# Patient Record
Sex: FEMALE | Race: WHITE | Employment: OTHER | URBAN - METROPOLITAN AREA
[De-identification: names, ages, dates, MRNs, and addresses within clinical notes are randomized per-mention and may not be internally consistent; named-entity substitution may affect disease eponyms.]

---

## 2017-01-16 NOTE — PATIENT DISCUSSION
PITUITARY TUMOR: STABLE. NO EVIDENCE OF OCULAR INVOLVEMENT. FOLLOW UP WITH DR Alfred Canavan AS SCHEDULED. LAST MRI 12/2016.  FOLLOW FOR CHANGES,OU

## 2017-01-16 NOTE — PATIENT DISCUSSION
BREANNA OU:  PRESCRIBED UV PROTECTION TO SLOW GROWTH. PRESCRIBE ARTIFICAL TEARS TO INCREASE COMFORT.

## 2017-01-16 NOTE — PATIENT DISCUSSION
AMD (DRY), OU:  PRESCRIBE AREDS 2 VITAMINS AND UV PROTECTION TO SLOW PROGRESSION. SMOKING CESSATION EMPHASIZED. PRESCRIBE REGULAR AMSLER GRID CHECKS TO SELF MONITOR. PATIENT TO CALL WITH CHANGES FOR RETINA CONSULT. RETURN FOR FOLLOW-UP AS SCHEDULED.

## 2017-01-16 NOTE — PATIENT DISCUSSION
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

## 2018-02-16 NOTE — PATIENT DISCUSSION
PITUITARY TUMOR: STABLE. NO EVIDENCE OF OCULAR INVOLVEMENT. FOLLOW UP WITH DR Janay Stokes AS SCHEDULED. LAST MRI 12/2017.  FOLLOW FOR CHANGES,OU

## 2018-02-16 NOTE — PATIENT DISCUSSION
GLAUCOMA SUSPECT, OU : INTRAOCULAR PRESSURE  IS WITHIN ACCEPTABLE LIMITS. RETURN 6 MONTHS FOR IOP CHECK WITH RNFL OCT WITH [facility]. NO DROP THERAPY NEEDED AT THIS TIME.

## 2018-08-31 NOTE — PATIENT DISCUSSION
GLAUCOMA SUSPECT, OU : INTRAOCULAR PRESSURE LOW AND OCT IS WITHIN ACCEPTABLE LIMITS. UNABLE TO GET PACHS TODAY (PACHYMETER NOT WORKING)- COMPLETE AT NEXT EXAM. NO DROP THERAPY NEEDED AT THIS TIME.

## 2019-12-18 NOTE — PATIENT DISCUSSION
H/O PITUITARY TUMOR: STABLE. NO EVIDENCE OF OCULAR INVOLVEMENT. FOLLOW UP WITH DR Janay Stokes AS SCHEDULED.

## 2019-12-18 NOTE — PATIENT DISCUSSION
POSTERIOR CAPSULAR FIBROSIS, OU:  VISUALLY SIGNIFICANT. OPTION OF YAG LASER VERSUS UPDATING GLASSES VERSUS FOLLOWING DISCUSSED. RBA'S DISCUSSED, PATIENT UNDERSTANDS AND DESIRES YAG LASER TO INCREASE VISION FOR WATCHING TV AND READING. SCHEDULE YAG LASER OD THEN OS.

## 2019-12-18 NOTE — PATIENT DISCUSSION
GLAUCOMA SUSPECT, OU : INTRAOCULAR PRESSURE IS WITHIN ACCEPTABLE LIMITS. GONIO DONE TODAY TO DOCUMENT ANGLES. NO DROP THERAPY NEEDED AT THIS TIME.

## 2021-06-03 NOTE — PATIENT DISCUSSION
H/O PITUITARY TUMOR: STABLE. NO EVIDENCE OF OCULAR INVOLVEMENT. FOLLOW UP WITH DR Richard Daniel AS SCHEDULED.

## 2022-04-08 ENCOUNTER — PREPPED CHART (OUTPATIENT)
Dept: URBAN - METROPOLITAN AREA CLINIC 15 | Facility: CLINIC | Age: 72
End: 2022-04-08

## 2022-04-08 ENCOUNTER — ESTABLISHED PATIENT (OUTPATIENT)
Dept: URBAN - METROPOLITAN AREA CLINIC 15 | Facility: CLINIC | Age: 72
End: 2022-04-08

## 2022-04-08 DIAGNOSIS — H04.123: ICD-10-CM

## 2022-04-08 DIAGNOSIS — H26.493: ICD-10-CM

## 2022-04-08 PROCEDURE — 99214 OFFICE O/P EST MOD 30 MIN: CPT

## 2022-04-08 PROCEDURE — 92015 DETERMINE REFRACTIVE STATE: CPT

## 2022-04-08 ASSESSMENT — VISUAL ACUITY
OS_SC: J1
OD_SC: J7
OS_PH: 20/25
OD_SC: 20/40
OS_SC: 20/30
OD_PH: 20/25

## 2022-04-08 ASSESSMENT — TONOMETRY
OD_IOP_MMHG: 10
OS_IOP_MMHG: 10

## 2022-04-18 PROBLEM — Z98.890: Noted: 2022-04-18

## 2022-05-20 ENCOUNTER — POST-OP (OUTPATIENT)
Dept: URBAN - METROPOLITAN AREA CLINIC 15 | Facility: CLINIC | Age: 72
End: 2022-05-20

## 2022-05-20 DIAGNOSIS — H04.123: ICD-10-CM

## 2022-05-20 DIAGNOSIS — Z96.1: ICD-10-CM

## 2022-05-20 DIAGNOSIS — H26.493: ICD-10-CM

## 2022-05-20 PROCEDURE — 99024 POSTOP FOLLOW-UP VISIT: CPT

## 2022-05-20 PROCEDURE — 92015 DETERMINE REFRACTIVE STATE: CPT

## 2022-05-20 ASSESSMENT — VISUAL ACUITY
OD_CC: J1+
OS_SC: 20/25
OD_SC: 20/25
OS_CC: J3

## 2022-05-20 ASSESSMENT — TONOMETRY
OD_IOP_MMHG: 10
OS_IOP_MMHG: 12

## 2022-07-02 RX ORDER — DENOSUMAB 60 MG/ML
INJECTION SUBCUTANEOUS
COMMUNITY

## 2022-07-02 RX ORDER — NAPROXEN SODIUM 220 MG
TABLET ORAL
COMMUNITY
End: 2022-10-27

## 2022-07-02 RX ORDER — AZITHROMYCIN 250 MG/1
TABLET, FILM COATED ORAL
COMMUNITY
End: 2022-07-26 | Stop reason: ALTCHOICE

## 2022-07-02 RX ORDER — B-COMPLEX WITH VITAMIN C
TABLET ORAL
COMMUNITY
End: 2022-07-26 | Stop reason: CLARIF

## 2022-07-06 NOTE — PATIENT DISCUSSION
Discussed findings with patient. Rx AREDS 2 and monitor at home with [id number] [facility]. Patient educated on importance of UV protection, healthy diet and no smoking. Advised patient to RTC immediately if vision worsen and grid appears darker, wavy or blank. Continue follow up with Dr Spenser Adame.

## 2022-07-26 PROBLEM — F17.200 TOBACCO USE DISORDER: Status: ACTIVE | Noted: 2022-07-26

## 2022-07-26 PROBLEM — J02.9 SORE THROAT: Status: ACTIVE | Noted: 2022-07-26

## 2022-07-26 PROBLEM — Z11.52 ENCOUNTER FOR SCREENING FOR COVID-19: Status: ACTIVE | Noted: 2022-07-26

## 2022-07-26 PROBLEM — L65.9 HAIR LOSS: Status: ACTIVE | Noted: 2022-07-26

## 2022-07-26 PROBLEM — M81.0 OSTEOPOROSIS: Status: ACTIVE | Noted: 2022-07-26

## 2022-07-26 PROBLEM — D75.839 THROMBOCYTOSIS: Status: ACTIVE | Noted: 2022-07-26

## 2022-07-26 PROBLEM — R51.9 FREQUENT HEADACHES: Status: ACTIVE | Noted: 2022-07-26

## 2022-07-26 PROBLEM — R92.8 ABNORMAL ULTRASOUND OF BREAST: Status: ACTIVE | Noted: 2022-07-26

## 2022-07-26 PROBLEM — F98.8 ADD (ATTENTION DEFICIT DISORDER) WITHOUT HYPERACTIVITY: Status: ACTIVE | Noted: 2022-07-26

## 2022-07-26 PROBLEM — E78.5 HYPERLIPIDEMIA: Status: ACTIVE | Noted: 2022-07-26

## 2022-07-26 PROBLEM — E03.9 HYPOTHYROIDISM (ACQUIRED): Status: ACTIVE | Noted: 2022-07-26

## 2022-07-26 PROBLEM — L57.0 ACTINIC KERATOSIS: Status: ACTIVE | Noted: 2022-07-26

## 2022-07-26 PROBLEM — E04.1 THYROID NODULE: Status: ACTIVE | Noted: 2022-07-26

## 2022-07-26 PROBLEM — R53.82 CHRONIC FATIGUE: Status: ACTIVE | Noted: 2022-07-26

## 2022-07-26 PROBLEM — N95.8 OTHER SPECIFIED MENOPAUSAL AND PERIMENOPAUSAL DISORDERS: Status: ACTIVE | Noted: 2022-07-26

## 2022-07-26 PROBLEM — I77.9 BILATERAL CAROTID ARTERY DISEASE (HCC): Status: ACTIVE | Noted: 2022-07-26

## 2022-07-26 PROBLEM — F33.0 MILD EPISODE OF RECURRENT MAJOR DEPRESSIVE DISORDER (HCC): Status: ACTIVE | Noted: 2022-07-26

## 2022-07-26 PROBLEM — E04.2 MULTINODULAR THYROID: Status: ACTIVE | Noted: 2022-07-26

## 2022-07-26 PROBLEM — F32.9 MAJOR DEPRESSION, SINGLE EPISODE: Status: ACTIVE | Noted: 2022-07-26

## 2022-08-04 PROBLEM — M48.062 LUMBAR STENOSIS WITH NEUROGENIC CLAUDICATION: Status: ACTIVE | Noted: 2022-08-04

## 2022-08-04 PROBLEM — M48.061 LUMBAR STENOSIS WITHOUT NEUROGENIC CLAUDICATION: Status: ACTIVE | Noted: 2022-08-04

## 2022-08-25 PROBLEM — J02.9 SORE THROAT: Status: RESOLVED | Noted: 2022-07-26 | Resolved: 2022-08-25

## 2022-10-27 PROBLEM — M79.601 ARM PAIN, ANTERIOR, RIGHT: Status: ACTIVE | Noted: 2022-10-27

## 2022-11-16 ENCOUNTER — ESTABLISHED PATIENT (OUTPATIENT)
Dept: URBAN - METROPOLITAN AREA CLINIC 15 | Facility: CLINIC | Age: 72
End: 2022-11-16

## 2022-11-16 DIAGNOSIS — H26.492: ICD-10-CM

## 2022-11-16 DIAGNOSIS — H04.123: ICD-10-CM

## 2022-11-16 DIAGNOSIS — Z96.1: ICD-10-CM

## 2022-11-16 PROCEDURE — 99213 OFFICE O/P EST LOW 20 MIN: CPT

## 2022-11-16 ASSESSMENT — VISUAL ACUITY
OD_SC: 20/30+2
OU_SC: 20/25+2
OS_SC: 20/30+2
OD_SC: J5
OU_SC: J5
OS_SC: J5

## 2022-11-16 ASSESSMENT — TONOMETRY
OS_IOP_MMHG: 12
OD_IOP_MMHG: 10

## 2023-04-14 ENCOUNTER — EMERGENCY VISIT (OUTPATIENT)
Dept: URBAN - METROPOLITAN AREA CLINIC 16 | Facility: CLINIC | Age: 73
End: 2023-04-14

## 2023-04-14 DIAGNOSIS — B02.39: ICD-10-CM

## 2023-04-14 PROCEDURE — 99213 OFFICE O/P EST LOW 20 MIN: CPT

## 2023-04-14 ASSESSMENT — VISUAL ACUITY
OD_SC: 20/25
OS_SC: 20/25

## 2023-04-14 ASSESSMENT — TONOMETRY
OS_IOP_MMHG: 11
OD_IOP_MMHG: 10

## 2023-04-18 ENCOUNTER — FOLLOW UP (OUTPATIENT)
Dept: URBAN - METROPOLITAN AREA CLINIC 16 | Facility: CLINIC | Age: 73
End: 2023-04-18

## 2023-04-18 DIAGNOSIS — B02.39: ICD-10-CM

## 2023-04-18 DIAGNOSIS — H04.123: ICD-10-CM

## 2023-04-18 DIAGNOSIS — Z96.1: ICD-10-CM

## 2023-04-18 PROCEDURE — 99213 OFFICE O/P EST LOW 20 MIN: CPT

## 2023-04-18 ASSESSMENT — VISUAL ACUITY
OS_SC: 20/20
OD_SC: 20/20-1

## 2023-08-31 NOTE — PATIENT DISCUSSION
Assessment/Plan:   Diagnoses and all orders for this visit:    Encounter to establish care  Palpitations  - established with Cardio and had a c/s on 8/21/2023  - s/p nml ECHO, has a Holter Monitor in place  - likely her s/s are 2/2 low Hb - see below     Low hemoglobin  - noted to have Hb 10.1   - has been taking Fe QD since 8/21/2023   - last ferritin was low (22 ng/mL) in 2021 and she has had chronic bleeding from uterine polyps without iron supplementation   - per pt scheduled for [facility] in 9/[facility]   - Fe panel pending (ordered by Cardio) - based on results may have to increase PO Fe to BID or will need referral to Heme/Onc for IV Fe supplementation   - educational handout given to pt re: Anemia   Iron deficiency anemia due to chronic blood loss  Uterine polyp    Other orders  -     Discontinue: phytonadione (MEPHYTON) 5 mg tablet; Take 10 mg by mouth daily (Patient not taking: Reported on 8/31/2023)  -     valACYclovir (VALTREX) 1,000 mg tablet  -     Docusate Sodium 100 MG capsule; TAKE ONE CAPSULE BY MOUTH TWICE A DAY FOR CONSTIPATION  -     sulfaSALAzine (AZULFIDINE) 500 mg tablet; Take 500 mg by mouth 4 (four) times a day          Subjective:    Patient ID: Nayan Brasher is a 32 y.o. female.   Nayan Brasher is a 32 y.o. female who presents to the office to establish care, an annual exam and ER f/u   - prior PCP: was going to [facility] (served in the [employer] for [employer])   - Specialists: Cardio   - PMHx: Allergies, Anemia, GERD, Depression/Anxiety, Migraines, Inflammation, HSV  - allergies: shellfish - angioedema   - Meds: see med rec   - PSHx: wisdom teeth extraction   - FHx: M (a/w), F (a/w), MA (cancer)  - Immunizations: UTD with Tdap, UTD with COVID IMMs but no Booster, unsure of HPV   - GYN Hx: [facility] - [facility] - UTD with annual and Cervical Ca screening   - Hm: due for Breast Ca screening at 41yo, due for Colon Ca screening at 44yo   - diet/exercise: active, balanced diet - Comments:Any Combo drinks on ave 16oz/coffee/day, herbal tea sometimes but no soda   - social: denies tob/illicts, social EtOH (wine)   - sexual Hx: active with spouse, denies STD screening   - last vision: no glasses or contacts, denies change in vision   - last dental: last OV was ~1yr ago   - was seen in the ER on 8/20/2023 - noted to have low hemoglobin (Fe panel pending)  - was see by Cardio on 8/21/2023 - s/p nml ECHO, has a Holter Monitor in place  - no records from the [place] - "uterine polyps" and irregular uterine bleeding - has surgery (polypectomy) scheduled on 9/15/2023   - (+) palpitations, fatigue, SOB  - ROS: today in the office pt denies F/C/N/V/HA/visual changes/wheezing/abd pain/D/LE edema      The following portions of the patient's history were reviewed and updated as appropriate: allergies, current medications, past family history, past medical history, past social history, past surgical history and problem list.    Review of Systems  as per HPI    Objective:  /78 (BP Location: Left arm, Patient Position: Sitting, Cuff Size: Standard)   Resp 16   Ht 5' 6" (1.676 m)   Wt 86.2 kg (190 lb)   BMI 30.67 kg/m²    Physical Exam  Vitals reviewed. Constitutional:       General: She is not in acute distress. Appearance: Normal appearance. She is not ill-appearing, toxic-appearing or diaphoretic. HENT:      Head: Normocephalic and atraumatic. Right Ear: External ear normal.      Left Ear: External ear normal.      Nose: Nose normal.   Eyes:      General: No scleral icterus. Right eye: No discharge. Left eye: No discharge. Extraocular Movements: Extraocular movements intact. Conjunctiva/sclera: Conjunctivae normal.      Comments: Pale conjunctiva    Cardiovascular:      Rate and Rhythm: Normal rate and regular rhythm. Heart sounds: Normal heart sounds. Pulmonary:      Effort: Pulmonary effort is normal. No respiratory distress. Breath sounds: Normal breath sounds.  No stridor. No wheezing, rhonchi or rales. Abdominal:      Palpations: Abdomen is soft. Musculoskeletal:         General: Normal range of motion. Cervical back: Normal range of motion. Right lower leg: No edema. Left lower leg: No edema. Skin:     General: Skin is warm. Neurological:      General: No focal deficit present. Mental Status: She is alert and oriented to person, place, and time. Psychiatric:         Mood and Affect: Mood normal.         Behavior: Behavior normal.         BMI Counseling: Body mass index is 30.67 kg/m². The BMI is above normal. Nutrition recommendations include 3-5 servings of fruits/vegetables daily. Exercise recommendations include exercising 3-5 times per week.